# Patient Record
Sex: MALE | Race: WHITE | URBAN - METROPOLITAN AREA
[De-identification: names, ages, dates, MRNs, and addresses within clinical notes are randomized per-mention and may not be internally consistent; named-entity substitution may affect disease eponyms.]

---

## 2017-06-22 ENCOUNTER — OFFICE VISIT (OUTPATIENT)
Dept: OPHTHALMOLOGY | Facility: CLINIC | Age: 67
End: 2017-06-22

## 2017-06-22 DIAGNOSIS — Z83.518 FAMILY HISTORY OF MACULAR DEGENERATION: ICD-10-CM

## 2017-06-22 DIAGNOSIS — H52.13 MYOPIA, BILATERAL: Primary | ICD-10-CM

## 2017-06-22 DIAGNOSIS — H25.13 SENILE NUCLEAR SCLEROSIS, BILATERAL: ICD-10-CM

## 2017-06-22 ASSESSMENT — REFRACTION_CURRENTRX
OS_SPHERE: -3.75
OS_DIAMETER: 14.0
OD_SPHERE: -2.00
OS_BASECURVE: 8.5
OD_BASECURVE: 8.5
OD_DIAMETER: 14.0
OD_DIAMETER: 14.2
OD_BRAND: 1-DAY ACUVUE MOIST
OS_BASECURVE: 8.8
OS_BRAND: 1-DAY ACUVUE MOIST
OD_SPHERE: -1.75
OS_BRAND: ACUVUE OASYS
OS_SPHERE: -3.50
OD_BRAND: ACUVUE OASYS
OS_DIAMETER: 14.2
OD_BASECURVE: 8.8

## 2017-06-22 ASSESSMENT — REFRACTION_MANIFEST
OS_CYLINDER: +0.25
OS_ADD: +2.50
OD_SPHERE: -2.00
OD_CYLINDER: +0.75
OS_SPHERE: -3.75
OS_AXIS: 085
OD_ADD: +2.50
OD_AXIS: 070

## 2017-06-22 ASSESSMENT — SLIT LAMP EXAM - LIDS
COMMENTS: NORMAL
COMMENTS: NORMAL

## 2017-06-22 ASSESSMENT — EXTERNAL EXAM - RIGHT EYE: OD_EXAM: NORMAL

## 2017-06-22 ASSESSMENT — CONF VISUAL FIELD
OS_NORMAL: 1
OD_NORMAL: 1

## 2017-06-22 ASSESSMENT — TONOMETRY
OD_IOP_MMHG: 21
OS_IOP_MMHG: 21
IOP_METHOD: ICARE

## 2017-06-22 ASSESSMENT — CUP TO DISC RATIO
OS_RATIO: 0.4
OD_RATIO: 0.5

## 2017-06-22 ASSESSMENT — VISUAL ACUITY
OS_CC: 20/20
CORRECTION_TYPE: CONTACTS
OD_CC: 20/20
METHOD: SNELLEN - LINEAR

## 2017-06-22 ASSESSMENT — EXTERNAL EXAM - LEFT EYE: OS_EXAM: NORMAL

## 2017-06-22 NOTE — PROGRESS NOTES
Assessment/Plan  (H52.13) Myopia, bilateral  (primary encounter diagnosis)  Comment: Compound myopic astigmatism with presbyopia OU  Plan: REFRACTION [54100], HC CONTACT LENS FITTING COSMETIC LVL 1 (07638.011)   Educated patient on condition and clinical findings. Dispensed spectacle prescription for full time wear.   Dispensed 1-Day Acuvue Moist trial lenses, given excellent fit and vision, if patient is please with comfort, okay to finalize prescription in 1 week (patient to contact clinic).   Acuvue Phyllis trial lenses ordered in the event that the daily lens is not comfortable, or if the patient has too much difficulty with opening a new package each day.    Addendum: The patient called stating that vsion and comfort were excellent, would like prescription for 1-Day lens. Finalized prescription dispensed.    (H25.13) Senile nuclear sclerosis, bilateral  Comment: Not visually significant OU  Plan:  No treatment indicated at this time.    (Z83.518) Family history of macular degeneration  Comment: No clinical findings.  Plan:  Monitor annually. Discussed a diet rich in leafy green vegetables.    Contact Lens Billing  V-Code:  - Soft toric  Final Contact Lens Rx      Brand Base Curve Diameter Sphere   Right 1-Day Acuvue Moist 8.5 14.2 -1.75   Left 1-Day Acuvue Moist 8.5 14.2 -3.75       Expiration Date:  6/23/2019    Replacement:  Daily    Wearing Schedule:  Daily wear         CL Fitting Fee: $75    These are for cosmetic contact lenses.    Encounter Diagnoses   Name Primary?     Myopia, bilateral Yes     Senile nuclear sclerosis, bilateral      Family history of macular degeneration         Date of last eye exam: 6/22/17        Complete documentation of historical and exam elements from today's encounter can  be found in the full encounter summary report (not reduplicated in this progress  note). I personally obtained the chief complaint(s) and history of present illness. I  confirmed and edited as necessary the  review of systems, past medical/surgical  history, family history, social history, and examination findings as documented by  others; and I examined the patient myself. I personally reviewed the relevant tests,  images, and reports as documented above. I formulated and edited as necessary the  assessment and plan and discussed the findings and management plan with the  patient and family.    Mahendra Henry OD, FAAO

## 2017-06-22 NOTE — NURSING NOTE
Chief Complaints and History of Present Illnesses   Patient presents with     COMPREHENSIVE EYE EXAM     RE VA is better than LE     Contact Lens Evaluation     HPI    Affected eye(s):  Both   Symptoms:        Duration:  1 year      Do you have eye pain now?:  No      Comments:  Pt states RE VA is better than LE  Using Reading glasses over contact  Lens, No other concerns  Opal CORDERO 9:11 AM June 22, 2017

## 2017-06-22 NOTE — MR AVS SNAPSHOT
After Visit Summary   6/22/2017    Roe Jimenez    MRN: [id number]           Patient Information     Date Of Birth          1950        Visit Information        Provider Department      6/22/2017 9:00 AM Mahendra Henry OD [facility]        Today's Diagnoses     Myopia, bilateral    -  1    Senile nuclear sclerosis, bilateral        Family history of macular degeneration           Follow-ups after your visit        Follow-up notes from your care team     Return if symptoms worsen or fail to improve, for Contact Lens Follow-up.      Who to contact     Please call your clinic at [phone] to:    Ask questions about your health    Make or cancel appointments    Discuss your medicines    Learn about your test results    Speak to your doctor   If you have compliments or concerns about an experience at your clinic, or if you wish to file a complaint, please contact [facility] Patient Relations at [phone] or email us at [email or web address]         Additional Information About Your Visit        Care EveryWhere ID     This is your Care EveryWhere ID. This could be used by other organizations to access your [place] medical records  [id number]         Blood Pressure from Last 3 Encounters:   No data found for BP    Weight from Last 3 Encounters:   No data found for Wt              We Performed the Following     HC CONTACT LENS FITTING COSMETIC LVL 1 (53431.011)     REFRACTION [17884]        Primary Care Provider Office Phone # Fax #    Daryl Young [phone]       [facility] [address] [address]        Equal Access to Services     DECLAN ROLAND : Hadii aad caridad welsho Soyannick, waaxda luqadaha, qaybta kaalmada nikoleyanan, waxhebert liseth castillo. So [facility] [phone].    ATENCIÓN: Si habla español, tiene a mendiola disposición servicios gratuitos de asistencia  lingüística. Alyson al [phone].    We comply with applicable federal civil rights laws and [place] laws. We do not discriminate on the basis of race, color, national origin, age, disability sex, sexual orientation or gender identity.            Thank you!     Thank you for choosing [facility]  for your care. Our goal is always to provide you with excellent care. Hearing back from our patients is one way we can continue to improve our services. Please take a few minutes to complete the written survey that you may receive in the mail after your visit with us. Thank you!             Your Updated Medication List - Protect others around you: Learn how to safely use, store and throw away your medicines at www.disposemymeds.org.          This list is accurate as of: 6/22/17 11:26 AM.  Always use your most recent med list.                   Brand Name Dispense Instructions for use Diagnosis    aspirin 81 MG tablet      Take by mouth daily        hydrochlorothiazide 12.5 MG capsule    MICROZIDE     Take 12.5 mg by mouth daily        LISINOPRIL PO           PANTOPRAZOLE SODIUM PO           SIMVASTATIN PO           TERAZOSIN HCL PO

## 2017-08-14 ENCOUNTER — OFFICE VISIT (OUTPATIENT)
Dept: OPHTHALMOLOGY | Facility: CLINIC | Age: 67
End: 2017-08-14

## 2017-08-14 DIAGNOSIS — H10.13 ALLERGIC CONJUNCTIVITIS, BILATERAL: ICD-10-CM

## 2017-08-14 DIAGNOSIS — H02.889 MEIBOMIAN GLAND DYSFUNCTION: Primary | ICD-10-CM

## 2017-08-14 RX ORDER — OLOPATADINE HYDROCHLORIDE 1 MG/ML
1 SOLUTION/ DROPS OPHTHALMIC 2 TIMES DAILY
Qty: 1 BOTTLE | Refills: 3 | Status: SHIPPED | OUTPATIENT
Start: 2017-08-14 | End: 2018-06-04

## 2017-08-14 ASSESSMENT — SLIT LAMP EXAM - LIDS: COMMENTS: 1+ MGD

## 2017-08-14 ASSESSMENT — EXTERNAL EXAM - LEFT EYE: OS_EXAM: NORMAL

## 2017-08-14 ASSESSMENT — TONOMETRY
IOP_METHOD: ICARE
OS_IOP_MMHG: 17
OD_IOP_MMHG: 16

## 2017-08-14 ASSESSMENT — VISUAL ACUITY
OS_CC+: -1
OD_CC+: -2+2
OS_CC: 20/20
METHOD: SNELLEN - LINEAR
CORRECTION_TYPE: GLASSES
OD_CC: 20/25

## 2017-08-14 ASSESSMENT — EXTERNAL EXAM - RIGHT EYE: OD_EXAM: NORMAL

## 2017-08-14 ASSESSMENT — CONF VISUAL FIELD
OD_NORMAL: 1
OS_NORMAL: 1

## 2017-08-14 NOTE — MR AVS SNAPSHOT
After Visit Summary   8/14/2017    Roe Jimenez    MRN: [id number]           Patient Information     Date Of Birth          1950        Visit Information        Provider Department      8/14/2017 9:40 AM Mahendra Henry, [facility]        Today's Diagnoses     Meibomian gland dysfunction    -  1    Allergic conjunctivitis, bilateral           Follow-ups after your visit        Follow-up notes from your care team     Return in about 1 month (around 9/14/2017).      Who to contact     Please call your clinic at [phone] to:    Ask questions about your health    Make or cancel appointments    Discuss your medicines    Learn about your test results    Speak to your doctor   If you have compliments or concerns about an experience at your clinic, or if you wish to file a complaint, please contact [facility] Patient Relations at [phone] or email us at [email or web address]         Additional Information About Your Visit        Care EveryWhere ID     This is your Care EveryWhere ID. This could be used by other organizations to access your [place] medical records  [id number]         Blood Pressure from Last 3 Encounters:   No data found for BP    Weight from Last 3 Encounters:   No data found for Wt              Today, you had the following     No orders found for display         Today's Medication Changes          These changes are accurate as of: 8/14/17 10:25 AM.  If you have any questions, ask your nurse or doctor.               Start taking these medicines.        Dose/Directions    olopatadine 0.1 % ophthalmic solution   Commonly known as:  PATANOL   Used for:  Allergic conjunctivitis, bilateral   Started by:  Mahendra Henry, OD        Dose:  1 drop   Place 1 drop into both eyes 2 times daily   Quantity:  1 Bottle   Refills:  3            Where to get your medicines      These medications were sent to [facility]  [facility] [address] [facility] [place] [address] AT [facility] [address], [facility] [address]     Phone:  [phone]     olopatadine 0.1 % ophthalmic solution                Primary Care Provider Office Phone # Fax #    Daryl Young [phone]       [facility] [address] [facility] [address]        Equal Access to Services     DECLAN ROLAND : Hadii aad ku hadasho Soomaali, waaxda luqadaha, qaybta kaalmada adeegyada, waxay idiin hayaan adeeg kharash la'yadiel castillo. So [facility] [phone].    ATENCIÓN: Si habla español, tiene a mendiola disposición servicios gratuitos de asistencia lingüística. [facility] [phone].    We comply with applicable federal civil rights laws and [place] laws. We do not discriminate on the basis of race, color, national origin, age, disability sex, sexual orientation or gender identity.            Thank you!     Thank you for choosing [facility]  for your care. Our goal is always to provide you with excellent care. Hearing back from our patients is one way we can continue to improve our services. Please take a few minutes to complete the written survey that you may receive in the mail after your visit with us. Thank you!             Your Updated Medication List - Protect others around you: Learn how to safely use, store and throw away your medicines at www.disposemymeds.org.          This list is accurate as of: 8/14/17 10:25 AM.  Always use your most recent med list.                   Brand Name Dispense Instructions for use Diagnosis    aspirin 81 MG tablet      Take by mouth daily        hydrochlorothiazide 12.5 MG capsule    MICROZIDE     Take 12.5 mg by mouth daily        LISINOPRIL PO           olopatadine 0.1 % ophthalmic solution    PATANOL    1 Bottle    Place 1 drop into both eyes 2 times daily    Allergic conjunctivitis, bilateral       PANTOPRAZOLE SODIUM PO           SIMVASTATIN PO            TERAZOSIN HCL PO

## 2017-08-14 NOTE — NURSING NOTE
Chief Complaints and History of Present Illnesses   Patient presents with     Eye Problem Both Eyes     Irritation wearing contact lens'     HPI    Symptoms:        Duration:  2 days   Frequency:  Constant       Do you have eye pain now?:  Yes   Location:  OD   Pain Level:  Mild Pain (2)      Comments:  Pt stopped wearing contacts yesterday  Evening and eyes seem better without  Contacts in Pt upset he has purchased  A years supply already, he hopes its  An eye infection but seems like the  Contact lens, pain when moving eyes  And pressing on eye lids  Opal Howell COT 9:50 AM August 14, 2017

## 2017-08-14 NOTE — PROGRESS NOTES
Assessment/Plan  (H02.89) Meibomian gland dysfunction  (primary encounter diagnosis)  Comment: Capped glands OD, signs worse OD than OS  Plan:  Educated patient on condition and clinical findings. Recommended warm compresses 3-4 times each day for ten minutes and artificial tears prn. Monitor at follow-up in 1 month, or sooner if symptoms worsen.    (H10.13) Allergic conjunctivitis, bilateral  Plan: olopatadine (PATANOL) 0.1 % ophthalmic solution   Prescribed olopatadine bid OU. Monitor at follow-up.          Complete documentation of historical and exam elements from today's encounter can  be found in the full encounter summary report (not reduplicated in this progress  note). I personally obtained the chief complaint(s) and history of present illness. I  confirmed and edited as necessary the review of systems, past medical/surgical  history, family history, social history, and examination findings as documented by  others; and I examined the patient myself. I personally reviewed the relevant tests,  images, and reports as documented above. I formulated and edited as necessary the  assessment and plan and discussed the findings and management plan with the  patient and family.    Mahendra Henry, OD, FAAO

## 2017-09-28 ENCOUNTER — OFFICE VISIT (OUTPATIENT)
Dept: OPHTHALMOLOGY | Facility: CLINIC | Age: 67
End: 2017-09-28

## 2017-09-28 DIAGNOSIS — H10.13 ALLERGIC CONJUNCTIVITIS, BILATERAL: ICD-10-CM

## 2017-09-28 DIAGNOSIS — H02.889 MEIBOMIAN GLAND DYSFUNCTION: Primary | ICD-10-CM

## 2017-09-28 ASSESSMENT — VISUAL ACUITY
OD_SC: 20/20
OS_SC+: -1
METHOD: SNELLEN - LINEAR
OS_SC: 20/20

## 2017-09-28 ASSESSMENT — CONF VISUAL FIELD
OS_NORMAL: 1
OD_NORMAL: 1

## 2017-09-28 ASSESSMENT — TONOMETRY
IOP_METHOD: ICARE
OD_IOP_MMHG: 18
OS_IOP_MMHG: 17

## 2017-09-28 ASSESSMENT — EXTERNAL EXAM - LEFT EYE: OS_EXAM: NORMAL

## 2017-09-28 ASSESSMENT — EXTERNAL EXAM - RIGHT EYE: OD_EXAM: NORMAL

## 2017-09-28 NOTE — PROGRESS NOTES
Assessment/Plan  (H02.89) Meibomian gland dysfunction  (primary encounter diagnosis)  Comment: Symptoms and signs significantly improved with therapy  Plan:  Educated patient on condition and clinical findings. Continue use of warm compresses once each day for ten minutes. Recommended Blink Contacts while lenses are worn, and Systane Balance at night. Monitor annually, or sooner if symptoms worsen.    (H10.13) Allergic conjunctivitis, bilateral  Comment: Patient discontinued drops  Plan:  Recommended restarting use of Patanol bid OU throughout allergy season.      Complete documentation of historical and exam elements from today's encounter can  be found in the full encounter summary report (not reduplicated in this progress  note). I personally obtained the chief complaint(s) and history of present illness. I  confirmed and edited as necessary the review of systems, past medical/surgical  history, family history, social history, and examination findings as documented by  others; and I examined the patient myself. I personally reviewed the relevant tests,  images, and reports as documented above. I formulated and edited as necessary the  assessment and plan and discussed the findings and management plan with the  patient and family.    Mahendra Henry OD, FAAO

## 2017-09-28 NOTE — MR AVS SNAPSHOT
After Visit Summary   2017    Roe Jimenez    MRN: [id number]           Patient Information     Date Of Birth          1950        Visit Information        Provider Department      2017 10:20 AM Mahendra Henry OD [facility]        Today's Diagnoses     Meibomian gland dysfunction    -  1    Allergic conjunctivitis, bilateral           Follow-ups after your visit        Follow-up notes from your care team     Return if symptoms worsen or fail to improve, for Follow Up.      Who to contact     Please call your clinic at [phone] to:    Ask questions about your health    Make or cancel appointments    Discuss your medicines    Learn about your test results    Speak to your doctor   If you have compliments or concerns about an experience at your clinic, or if you wish to file a complaint, please contact [facility] Patient Relations at [phone] or email us at [email or web address]         Additional Information About Your Visit        MyChart Information     [email or web address] is an electronic gateway that provides easy, online access to your medical records. With [email or web address], you can request a clinic appointment, read your test results, renew a prescription or communicate with your care team.     To sign up for MobileProt visit the website at [email or web address]   You will be asked to enter the access code listed below, as well as some personal information. Please follow the directions to create your username and password.     Your access code is: [email or web address]  Expires: 2017 11:01 AM     Your access code will  in 90 days. If you need help or a new code, please contact your [facility] Clinic or call [phone] for assistance.        Care EveryWhere ID     This is your Care EveryWhere ID. This could be used by other organizations to access your [place] medical records  [id number]          Blood Pressure from Last 3 Encounters:   No data found for BP    Weight from Last 3 Encounters:   No data found for Wt              Today, you had the following     No orders found for display       Primary Care Provider Office Phone # Fax #    Daryl Young [phone]       [facility] [address] [facility] [address]        Equal Access to Services     [facility]: Hadii aad ku hadasho Soomaali, waaxda luqadaha, qaybta kaalmada adeegyada, waxay idiin hayaan adeeg kharash la'yadiel . So [facility] [phone].    ATENCIÓN: Si habla español, tiene a mendiola disposición servicios gratuitos de asistencia lingüística. Llame al [phone].    We comply with applicable federal civil rights laws and [place] laws. We do not discriminate on the basis of race, color, national origin, age, disability sex, sexual orientation or gender identity.            Thank you!     Thank you for choosing [facility]  for your care. Our goal is always to provide you with excellent care. Hearing back from our patients is one way we can continue to improve our services. Please take a few minutes to complete the written survey that you may receive in the mail after your visit with us. Thank you!             Your Updated Medication List - Protect others around you: Learn how to safely use, store and throw away your medicines at www.disposemymeds.org.          This list is accurate as of: 9/28/17 11:01 AM.  Always use your most recent med list.                   Brand Name Dispense Instructions for use Diagnosis    aspirin 81 MG tablet      Take by mouth daily        hydrochlorothiazide 12.5 MG capsule    MICROZIDE     Take 12.5 mg by mouth daily        LISINOPRIL PO           olopatadine 0.1 % ophthalmic solution    PATANOL    1 Bottle    Place 1 drop into both eyes 2 times daily    Allergic conjunctivitis, bilateral       PANTOPRAZOLE SODIUM PO           SIMVASTATIN PO            TERAZOSIN HCL PO

## 2018-06-04 ENCOUNTER — OFFICE VISIT (OUTPATIENT)
Dept: OPHTHALMOLOGY | Facility: CLINIC | Age: 68
End: 2018-06-04
Payer: COMMERCIAL

## 2018-06-04 DIAGNOSIS — H02.889 MEIBOMIAN GLAND DYSFUNCTION: ICD-10-CM

## 2018-06-04 DIAGNOSIS — H52.13 MYOPIA OF BOTH EYES: ICD-10-CM

## 2018-06-04 DIAGNOSIS — H10.13 ALLERGIC CONJUNCTIVITIS OF BOTH EYES: ICD-10-CM

## 2018-06-04 DIAGNOSIS — H25.13 NUCLEAR SCLEROSIS OF BOTH EYES: Primary | ICD-10-CM

## 2018-06-04 DIAGNOSIS — Z83.518 FAMILY HISTORY OF MACULAR DEGENERATION: ICD-10-CM

## 2018-06-04 DIAGNOSIS — H53.10 SUBJECTIVE VISUAL DISTURBANCE OF BOTH EYES: ICD-10-CM

## 2018-06-04 ASSESSMENT — TONOMETRY
OD_IOP_MMHG: 21
OS_IOP_MMHG: 21
IOP_METHOD: ICARE

## 2018-06-04 ASSESSMENT — VISUAL ACUITY
CORRECTION_TYPE: CONTACTS
METHOD: SNELLEN - LINEAR
OS_CC: 20/20
OD_CC: 20/20
CORRECTION_TYPE: GLASSES

## 2018-06-04 ASSESSMENT — CUP TO DISC RATIO
OD_RATIO: 0.6/0.5
OS_RATIO: 0.5

## 2018-06-04 ASSESSMENT — REFRACTION_MANIFEST
OD_SPHERE: PLANO
OS_SPHERE: PLANO

## 2018-06-04 ASSESSMENT — EXTERNAL EXAM - RIGHT EYE: OD_EXAM: NORMAL

## 2018-06-04 ASSESSMENT — EXTERNAL EXAM - LEFT EYE: OS_EXAM: NORMAL

## 2018-06-04 ASSESSMENT — CONF VISUAL FIELD
OS_NORMAL: 1
OD_NORMAL: 1

## 2018-06-04 NOTE — NURSING NOTE
Chief Complaints and History of Present Illnesses   Patient presents with     Annual Eye Exam     Visual Field Disturbance     HPI    Symptoms:        Duration:  6 months   Frequency:  Intermittent, Weekly       Do you have eye pain now?:  No      Comments:  Noticed when driving  It is a light in Medial portion  Of RE, Denies flashes and  Floaters, using Blink for Contacts  Opal CORDERO 10:06 AM June 4, 2018

## 2018-06-04 NOTE — MR AVS SNAPSHOT
After Visit Summary   2018    Roe Jimenez    MRN: [id number]           Patient Information     Date Of Birth          1950        Visit Information        Provider Department      2018 10:00 AM Mahendra Henry OD [facility]        Today's Diagnoses     Nuclear sclerosis of both eyes    -  1    Subjective visual disturbance of both eyes        Meibomian gland dysfunction        Allergic conjunctivitis of both eyes        Family history of macular degeneration        Myopia of both eyes           Follow-ups after your visit        Who to contact     Please call your clinic at [phone] to:    Ask questions about your health    Make or cancel appointments    Discuss your medicines    Learn about your test results    Speak to your doctor            Additional Information About Your Visit        MyChart Information     Reclutec is an electronic gateway that provides easy, online access to your medical records. With Reclutec, you can request a clinic appointment, read your test results, renew a prescription or communicate with your care team.     To sign up for Tytanium Ideast visit the website at [email or web address]   You will be asked to enter the access code listed below, as well as some personal information. Please follow the directions to create your username and password.     Your access code is: [email or web address]  Expires: 2018  6:31 AM     Your access code will  in 90 days. If you need help or a new code, please contact your [facility] or call [phone] for assistance.        Care EveryWhere ID     This is your Care EveryWhere ID. This could be used by other organizations to access your [place] medical records  [id number]         Blood Pressure from Last 3 Encounters:   No data found for BP    Weight from Last 3 Encounters:   No data found for Wt              Today, you had the following     No orders found  for display       Primary Care Provider Office Phone # Fax #    Daryl Young [phone]       [facility] [address] [facility] [address]        Equal Access to Services     DECLAN ROLAND : Hadii aad ku hadadryo Soomaali, waaxda luqadaha, qaybta kaalmada adeegyada, waxhebert doddn nenavalerie rodriguez wagner castillo. So [facility] [phone].    ATENCIÓN: Si habla español, tiene a mendiola disposición servicios gratuitos de asistencia lingüística. Llame al [phone].    We comply with applicable federal civil rights laws and [place] laws. We do not discriminate on the basis of race, color, national origin, age, disability, sex, sexual orientation, or gender identity.            Thank you!     Thank you for choosing [facility]  for your care. Our goal is always to provide you with excellent care. Hearing back from our patients is one way we can continue to improve our services. Please take a few minutes to complete the written survey that you may receive in the mail after your visit with us. Thank you!             Your Updated Medication List - Protect others around you: Learn how to safely use, store and throw away your medicines at www.disposemymeds.org.          This list is accurate as of 6/4/18 12:42 PM.  Always use your most recent med list.                   Brand Name Dispense Instructions for use Diagnosis    aspirin 81 MG tablet      Take by mouth daily        hydrochlorothiazide 12.5 MG capsule    MICROZIDE     Take 12.5 mg by mouth daily        LISINOPRIL PO           PANTOPRAZOLE SODIUM PO           SIMVASTATIN PO           TERAZOSIN HCL PO

## 2018-06-04 NOTE — PROGRESS NOTES
Assessment/Plan  (H25.13) Nuclear sclerosis of both eyes  (primary encounter diagnosis)  Comment: Not visually significant OU  Plan:  Educated patient on clinical findings. No treatment indicated at this time. Monitor annually.    (H53.10) Subjective visual disturbance of both eyes  Comment: Clears with blinking, infrequent  Plan:  Explained to patient that symptoms sound consistent with ocular surface disease, such as dry eye and/or allergies.    (H02.89) Meibomian gland dysfunction  Comment: Improved symptoms and signs with warm compresses  Plan:  Continue use of warm compresses as needed. Monitor annually, or sooner if symptoms worsen.    (H10.13) Allergic conjunctivitis of both eyes  Comment: Minimally symptomatic  Plan:  Recommended Zaditor during allergy seasons as needed.    (Z83.518) Family history of macular degeneration  Comment: No findings consistent with macular degeneration  Plan:  Monitor annually. Recommended diet rich with leafy greens.     (H52.13) Myopia of both eyes  Comment: Not assessed at this visit.  Plan:  Refraction and contact lens fitting to be rechecked next year.    Return to clinic in 1 year for comprehensive eye exam and contact lens fitting.    Complete documentation of historical and exam elements from today's encounter can  be found in the full encounter summary report (not reduplicated in this progress  note). I personally obtained the chief complaint(s) and history of present illness. I  confirmed and edited as necessary the review of systems, past medical/surgical  history, family history, social history, and examination findings as documented by  others; and I examined the patient myself. I personally reviewed the relevant tests,  images, and reports as documented above. I formulated and edited as necessary the  assessment and plan and discussed the findings and management plan with the  patient and family.    Mahendra Henry, OD, FAAO

## 2019-01-29 ENCOUNTER — OFFICE VISIT (OUTPATIENT)
Dept: OPHTHALMOLOGY | Facility: CLINIC | Age: 69
End: 2019-01-29
Payer: COMMERCIAL

## 2019-01-29 DIAGNOSIS — H25.13 NUCLEAR SCLEROTIC CATARACT OF BOTH EYES: ICD-10-CM

## 2019-01-29 DIAGNOSIS — H52.13 MYOPIA, BILATERAL: ICD-10-CM

## 2019-01-29 DIAGNOSIS — H43.813 PVD (POSTERIOR VITREOUS DETACHMENT), BILATERAL: Primary | ICD-10-CM

## 2019-01-29 ASSESSMENT — TONOMETRY
OD_IOP_MMHG: 15
IOP_METHOD: TONOPEN
OS_IOP_MMHG: 16

## 2019-01-29 ASSESSMENT — VISUAL ACUITY
METHOD: SNELLEN - LINEAR
OD_CC: 20/20-2
OS_CC: 20/20+3
CORRECTION_TYPE: CONTACTS

## 2019-01-29 ASSESSMENT — CONF VISUAL FIELD
OS_NORMAL: 1
METHOD: COUNTING FINGERS
OD_NORMAL: 1

## 2019-01-29 ASSESSMENT — CUP TO DISC RATIO
OD_RATIO: 0.55/0.5
OS_RATIO: 0.4

## 2019-01-29 ASSESSMENT — EXTERNAL EXAM - LEFT EYE: OS_EXAM: NORMAL

## 2019-01-29 ASSESSMENT — EXTERNAL EXAM - RIGHT EYE: OD_EXAM: NORMAL

## 2019-01-29 NOTE — PROGRESS NOTES
HPI  Roe Jimenez is a 68 year old male here for new floaters right eye in temporal periphery that occasionally come in to mid range vision, began Saturday night.  No flashing lights.  Floaters are not black, and they are not round.  Described as a crescent shape and they are more noticeable in brightly lit rooms. No eye trauma.    PMH:  Hypertension, hyperlipidemia, terazosin dilates well  POH:  Glasses for myopia, soft contact lens wear, no surgery, no trauma  Oc Meds:  none  FH:  Denies any glaucoma, +age related macular degeneration, no other known eye diseases       Assessment & Plan      (H43.813) PVD (posterior vitreous detachment), bilateral - Right Eye  (primary encounter diagnosis)  Comment:  No Daniel's sign, retinal tears, or detachment seen on exam today.  Plan:  Natural history of posterior vitreous detachment as well as retinal tear/detachment symptoms discussed with patient.  Told to call for prompt dilated exam if these symptoms occur.  Discussed looking up the eye clinics with patient and seeking care if any changes on upcoming trip to [facility].  Patient will follow-up here upon his return for another dilated fundus exam.    (H52.13) Myopia, bilateral - Both Eyes  Comment: good visual acuity with contact lenses  Plan: continue same    (H25.13) Nuclear sclerotic cataract of both eyes - Both Eyes  Comment: mild not visually significant   Plan: follow     Mild C/d asymmetry- good intraocular pressure follow for clinical changes  -----------------------------------------------------------------------------------    Patient disposition:   Return in about 1 month (around 2/28/2019) for Follow-up PVD, dilated OD. Call for sooner appointment as needed.    Complete documentation of historical and exam elements from today's encounter can be found in the full encounter summary report (not reduplicated in this progress note). I personally obtained the chief complaint(s) and history of present illness.   I have confirmed and edited as necessary the CC, HPI, PMH/PSH, social history, FMH, ROS, and exam/neuro findings as obtained by the technician or others. I have examined this patient myself and I personally viewed the image(s) and studies listed above and the documentation reflects my findings and interpretation.  I formulated and edited as necessary the assessment and plan and discussed the findings and management plan with the patient and family.     Merly Mckeon MD

## 2019-03-05 ENCOUNTER — OFFICE VISIT (OUTPATIENT)
Dept: OPHTHALMOLOGY | Facility: CLINIC | Age: 69
End: 2019-03-05
Payer: COMMERCIAL

## 2019-03-05 DIAGNOSIS — H43.813 PVD (POSTERIOR VITREOUS DETACHMENT), BILATERAL: ICD-10-CM

## 2019-03-05 DIAGNOSIS — H25.13 NUCLEAR SCLEROTIC CATARACT OF BOTH EYES: Primary | ICD-10-CM

## 2019-03-05 ASSESSMENT — VISUAL ACUITY
CORRECTION_TYPE: CONTACTS
METHOD: SNELLEN - LINEAR
OD_CC+: -2
OD_CC: 20/20
OS_CC: 20/20

## 2019-03-05 ASSESSMENT — CUP TO DISC RATIO
OS_RATIO: 0.4
OD_RATIO: 0.55/0.5

## 2019-03-05 ASSESSMENT — EXTERNAL EXAM - RIGHT EYE: OD_EXAM: NORMAL

## 2019-03-05 ASSESSMENT — REFRACTION_WEARINGRX
OS_CYLINDER: +0.25
OD_CYLINDER: SPHERE
OS_SPHERE: -4.25
SPECS_TYPE: PAL
OD_ADD: +2.50
OD_SPHERE: -2.00
OS_AXIS: 060
OS_ADD: +2.50

## 2019-03-05 ASSESSMENT — CONF VISUAL FIELD
OD_NORMAL: 1
OS_NORMAL: 1

## 2019-03-05 ASSESSMENT — EXTERNAL EXAM - LEFT EYE: OS_EXAM: NORMAL

## 2019-03-05 ASSESSMENT — TONOMETRY
OD_IOP_MMHG: 16
IOP_METHOD: TONOPEN

## 2019-03-05 NOTE — PROGRESS NOTES
HPI  Roe Jimenez is a 68 year old male here for follow-up floaters right eye in temporal periphery that occasionally come in to mid range vision.   No change from last visit, sees floaters less than before.      PMH:  Hypertension, hyperlipidemia, terazosin dilates well  POH:  Glasses for myopia, soft contact lens wear, no surgery, no trauma  Oc Meds:  none  FH:  Denies any glaucoma, +age related macular degeneration, no other known eye diseases       Assessment & Plan      (H43.383) PVD (posterior vitreous detachment), bilateral - Right Eye  (primary encounter diagnosis)  Comment:  No Daniel's sign, retinal tears, or detachment seen on exam today.  Plan:  Natural history of posterior vitreous detachment as well as retinal tear/detachment symptoms discussed with patient.  Told to call for prompt dilated exam if these symptoms occur.  Follow-up for comprehensive eye exam as scheduled, sooner if any changes.    -----------------------------------------------------------------------------------    Patient disposition:   Return in about 6 months (around 9/5/2019) for Comprehensive Exam. Call for sooner appointment as needed.    Complete documentation of historical and exam elements from today's encounter can be found in the full encounter summary report (not reduplicated in this progress note). I personally obtained the chief complaint(s) and history of present illness.  I have confirmed and edited as necessary the CC, HPI, PMH/PSH, social history, FMH, ROS, and exam/neuro findings as obtained by the technician or others. I have examined this patient myself and I personally viewed the image(s) and studies listed above and the documentation reflects my findings and interpretation.  I formulated and edited as necessary the assessment and plan and discussed the findings and management plan with the patient and family.     Merly Mckeon MD

## 2019-05-29 ENCOUNTER — OFFICE VISIT (OUTPATIENT)
Dept: OPHTHALMOLOGY | Facility: CLINIC | Age: 69
End: 2019-05-29
Payer: COMMERCIAL

## 2019-05-29 DIAGNOSIS — H52.13 MYOPIA OF BOTH EYES: Primary | ICD-10-CM

## 2019-05-29 ASSESSMENT — REFRACTION_CURRENTRX
OD_DIAMETER: 14.2
OS_BASECURVE: 8.5
OD_BRAND: 1-DAY ACUVUE MOIST
OS_DIAMETER: 14.2
OD_SPHERE: -1.75
OD_BASECURVE: 8.5
OS_DIAMETER: 14.2
OS_BRAND: 1-DAY ACUVUE MOIST
OS_SPHERE: -3.75
OD_DIAMETER: 14.2
OD_SPHERE: -1.50
OS_SPHERE: -3.50
OS_BASECURVE: 8.5
OS_BRAND: 1-DAY ACUVUE MOIST
OD_BASECURVE: 8.5
OD_BRAND: 1-DAY ACUVUE MOIST

## 2019-05-29 ASSESSMENT — CONF VISUAL FIELD
OS_NORMAL: 1
METHOD: COUNTING FINGERS
OD_NORMAL: 1

## 2019-05-29 ASSESSMENT — TONOMETRY
OS_IOP_MMHG: 16
IOP_METHOD: ICARE
OD_IOP_MMHG: 14

## 2019-05-29 ASSESSMENT — VISUAL ACUITY
METHOD: SNELLEN - LINEAR
CORRECTION_TYPE: CONTACTS
OS_CC: 20/20
OD_CC: 20/20
OS_CC+: -1

## 2019-05-29 ASSESSMENT — REFRACTION_MANIFEST
OS_CYLINDER: SPHERE
OD_AXIS: 070
OS_ADD: +2.00
OD_CYLINDER: +0.75
OS_SPHERE: -3.50
OD_SPHERE: -1.75
OD_ADD: +2.00

## 2019-05-29 ASSESSMENT — EXTERNAL EXAM - LEFT EYE: OS_EXAM: NORMAL

## 2019-05-29 ASSESSMENT — EXTERNAL EXAM - RIGHT EYE: OD_EXAM: NORMAL

## 2019-05-29 NOTE — PROGRESS NOTES
History  HPI     Contact Lens Evaluation     In both eyes.  This started 2 years ago.  Since onset it is stable.  Pain was noted as 0/10.              Comments     Patient is here for yearly check up with contact lens exam, happy with vision and are comfortable.     Rohini Calderon May 29, 2019 2:38 PM            Last edited by Mahendra Henry, OD on 5/29/2019  3:10 PM. (History)          Assessment/Plan  (H52.13) Myopia of both eyes  (primary encounter diagnosis)  Comment: Myopia with presbyopia both eyes, happy in contact lenses, wears reading glasses for near tasks  Plan: C CONTACT LENS FITTING COSMETIC LVL 1 - ADULT, REFRACTION         Educated patient on condition and clinical findings. Dispensed spectacle prescription for as-needed wear. Educated patient on possibility of adaptation period, if symptoms do not improve return to clinic for further testing.   Dispensed trial lenses and finalized contact lens prescription for 2 years.    No office visit charge assessed as patient is presenting for contact lens fit and refraction only.    Contact Lens Billing  V-Code:  - Soft spherical  Final Contact Lens Rx       Brand Base Curve Diameter Sphere    Right 1-Day Acuvue Moist 8.5 14.2 -1.50    Left 1-Day Acuvue Moist 8.5 14.2 -3.50    Expiration Date:  5/29/2021    Replacement:  Daily    Wearing Schedule:  Daily wear         CL Fitting Fee: $75    These are for cosmetic contact lenses.    Encounter Diagnosis   Name Primary?     Myopia of both eyes Yes      Complete documentation of historical and exam elements from today's encounter can  be found in the full encounter summary report (not reduplicated in this progress  note). I personally obtained the chief complaint(s) and history of present illness. I  confirmed and edited as necessary the review of systems, past medical/surgical  history, family history, social history, and examination findings as documented by  others; and I examined the patient myself. I  personally reviewed the relevant tests,  images, and reports as documented above. I formulated and edited as necessary the  assessment and plan and discussed the findings and management plan with the  patient and family.    Mahendra Henry OD, FAAO

## 2019-05-29 NOTE — NURSING NOTE
Chief Complaints and History of Present Illnesses   Patient presents with     COMPREHENSIVE EYE EXAM     Chief Complaint(s) and History of Present Illness(es)     COMPREHENSIVE EYE EXAM     Laterality: both eyes    Associated symptoms: dryness (last year, started using Blink for CTL qd, instills before puttling CTL in eyes.).  Negative for tearing, eye pain, flashes and floaters              Comments     Patient is here for yearly check up with contact lens exam, happy with vision and are comfortable.     Rohini Calderon May 29, 2019 2:38 PM

## 2024-07-27 ENCOUNTER — APPOINTMENT (OUTPATIENT)
Dept: CT IMAGING | Age: 74
End: 2024-07-27
Attending: EMERGENCY MEDICINE

## 2024-07-27 ENCOUNTER — HOSPITAL ENCOUNTER (EMERGENCY)
Age: 74
Discharge: HOME | End: 2024-07-27
Attending: EMERGENCY MEDICINE

## 2024-07-27 VITALS
BODY MASS INDEX: 25.9 KG/M2 | HEIGHT: 67 IN | WEIGHT: 165 LBS | TEMPERATURE: 97.5 F | OXYGEN SATURATION: 97 % | SYSTOLIC BLOOD PRESSURE: 144 MMHG | HEART RATE: 75 BPM | RESPIRATION RATE: 18 BRPM | DIASTOLIC BLOOD PRESSURE: 67 MMHG

## 2024-07-27 DIAGNOSIS — I95.9 HYPOTENSION, UNSPECIFIED HYPOTENSION TYPE: ICD-10-CM

## 2024-07-27 DIAGNOSIS — S09.90XA CLOSED HEAD INJURY, INITIAL ENCOUNTER: ICD-10-CM
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
## 2024-07-27 LAB
ALBUMIN SERPL-MCNC: 3.9 G/DL (ref 3.6–5.1)
ALBUMIN/GLOB SERPL: 1.5 {RATIO} (ref 1–2.4)
ALP SERPL-CCNC: 71 UNITS/L (ref 45–117)
ALT SERPL-CCNC: 31 UNITS/L
ANION GAP BLD CALC-SCNC: 14 MMOL/L (ref 7–19)
ANION GAP SERPL CALC-SCNC: 9 MMOL/L (ref 7–19)
AST SERPL-CCNC: 22 UNITS/L
ATRIAL RATE (BPM): 56
BASOPHILS # BLD: 0.1 K/MCL (ref 0–0.3)
BASOPHILS NFR BLD: 1 %
BILIRUB SERPL-MCNC: 0.6 MG/DL (ref 0.2–1)
BUN BLD-MCNC: 22 MG/DL (ref 6–20)
BUN SERPL-MCNC: 22 MG/DL (ref 6–20)
BUN/CREAT SERPL: 28 (ref 7–25)
CA-I BLD-SCNC: 1.21 MMOL/L (ref 1.15–1.29)
CALCIUM SERPL-MCNC: 9 MG/DL (ref 8.4–10.2)
CHLORIDE BLD-SCNC: 102 MMOL/L (ref 97–110)
CHLORIDE SERPL-SCNC: 105 MMOL/L (ref 97–110)
CO2 BLD-SCNC: 26 MMOL/L (ref 19–24)
CO2 SERPL-SCNC: 27 MMOL/L (ref 21–32)
CREAT SERPL-MCNC: 0.78 MG/DL (ref 0.67–1.17)
CREAT SERPL-MCNC: 0.9 MG/DL (ref 0.67–1.17)
DEPRECATED RDW RBC: 47.8 FL (ref 39–50)
DIASTOLIC BLOOD PRESSURE: 58
EGFRCR SERPLBLD CKD-EPI 2021: 89 ML/MIN/{1.73_M2}
EGFRCR SERPLBLD CKD-EPI 2021: >90 ML/MIN/{1.73_M2}
EOSINOPHIL # BLD: 0.6 K/MCL (ref 0–0.5)
EOSINOPHIL NFR BLD: 8 %
ERYTHROCYTE [DISTWIDTH] IN BLOOD: 13.5 % (ref 11–15)
ETHANOL SERPL-MCNC: 17 MG/DL
FASTING DURATION TIME PATIENT: ABNORMAL H
GLOBULIN SER-MCNC: 2.6 G/DL (ref 2–4)
GLUCOSE BLD-MCNC: 106 MG/DL (ref 70–99)
GLUCOSE SERPL-MCNC: 111 MG/DL (ref 70–99)
HCT VFR BLD CALC: 41 % (ref 39–51)
HCT VFR BLD CALC: 41.3 % (ref 39–51)
HGB BLD CALC-MCNC: 13.9 G/DL (ref 13–17)
HGB BLD-MCNC: 13.3 G/DL (ref 13–17)
IMM GRANULOCYTES # BLD AUTO: 0 K/MCL (ref 0–0.2)
IMM GRANULOCYTES # BLD: 0 %
LYMPHOCYTES # BLD: 1.7 K/MCL (ref 1–4)
LYMPHOCYTES NFR BLD: 23 %
MAGNESIUM SERPL-MCNC: 2.5 MG/DL (ref 1.7–2.4)
MCH RBC QN AUTO: 30.9 PG (ref 26–34)
MCHC RBC AUTO-ENTMCNC: 32.2 G/DL (ref 32–36.5)
MCV RBC AUTO: 95.8 FL (ref 78–100)
MONOCYTES # BLD: 0.5 K/MCL (ref 0.3–0.9)
MONOCYTES NFR BLD: 7 %
NEUTROPHILS # BLD: 4.3 K/MCL (ref 1.8–7.7)
NEUTROPHILS NFR BLD: 61 %
NRBC BLD MANUAL-RTO: 0 /100 WBC
NT-PROBNP SERPL-MCNC: 24 PG/ML
P AXIS (DEGREES): 86
PLATELET # BLD AUTO: 172 K/MCL (ref 140–450)
POTASSIUM BLD-SCNC: 4 MMOL/L (ref 3.4–5.1)
POTASSIUM SERPL-SCNC: 4 MMOL/L (ref 3.4–5.1)
PR-INTERVAL (MSEC): 222
PROT SERPL-MCNC: 6.5 G/DL (ref 6.4–8.2)
QRS-INTERVAL (MSEC): 98
QT-INTERVAL (MSEC): 432
QTC: 417
R AXIS (DEGREES): 25
RAINBOW EXTRA TUBES HOLD SPECIMEN: NORMAL
RBC # BLD: 4.31 MIL/MCL (ref 4.5–5.9)
REPORT TEXT: NORMAL
SODIUM BLD-SCNC: 137 MMOL/L (ref 135–145)
SODIUM SERPL-SCNC: 137 MMOL/L (ref 135–145)
SYSTOLIC BLOOD PRESSURE: 105
T AXIS (DEGREES): 59
TROPONIN I BLD-MCNC: <0.1 NG/ML
TROPONIN I SERPL DL<=0.01 NG/ML-MCNC: 5 NG/L
VENTRICULAR RATE EKG/MIN (BPM): 56
WBC # BLD: 7.1 K/MCL (ref 4.2–11)

## 2024-07-27 PROCEDURE — 82077 ASSAY SPEC XCP UR&BREATH IA: CPT | Performed by: EMERGENCY MEDICINE

## 2024-07-27 PROCEDURE — 96360 HYDRATION IV INFUSION INIT: CPT

## 2024-07-27 PROCEDURE — 80047 BASIC METABLC PNL IONIZED CA: CPT

## 2024-07-27 PROCEDURE — 80053 COMPREHEN METABOLIC PANEL: CPT | Performed by: EMERGENCY MEDICINE

## 2024-07-27 PROCEDURE — 83735 ASSAY OF MAGNESIUM: CPT | Performed by: EMERGENCY MEDICINE

## 2024-07-27 PROCEDURE — 85025 COMPLETE CBC W/AUTO DIFF WBC: CPT | Performed by: EMERGENCY MEDICINE

## 2024-07-27 PROCEDURE — 93005 ELECTROCARDIOGRAM TRACING: CPT | Performed by: EMERGENCY MEDICINE

## 2024-07-27 PROCEDURE — 70450 CT HEAD/BRAIN W/O DYE: CPT | Performed by: RADIOLOGY

## 2024-07-27 PROCEDURE — 96361 HYDRATE IV INFUSION ADD-ON: CPT

## 2024-07-27 PROCEDURE — 99285 EMERGENCY DEPT VISIT HI MDM: CPT

## 2024-07-27 PROCEDURE — 70450 CT HEAD/BRAIN W/O DYE: CPT

## 2024-07-27 PROCEDURE — 83880 ASSAY OF NATRIURETIC PEPTIDE: CPT | Performed by: EMERGENCY MEDICINE

## 2024-07-27 PROCEDURE — 10002807 HB RX 258: Performed by: EMERGENCY MEDICINE

## 2024-07-27 PROCEDURE — 84484 ASSAY OF TROPONIN QUANT: CPT | Performed by: EMERGENCY MEDICINE

## 2024-07-27 PROCEDURE — 84484 ASSAY OF TROPONIN QUANT: CPT

## 2024-07-27 RX ADMIN — SODIUM CHLORIDE 1000 ML: 9 INJECTION, SOLUTION INTRAVENOUS at 03:38

## 2024-07-27 RX ADMIN — SODIUM CHLORIDE 1000 ML: 9 INJECTION, SOLUTION INTRAVENOUS at 04:40

## 2024-07-27 SDOH — SOCIAL STABILITY: SOCIAL INSECURITY: HOW OFTEN DOES ANYONE, INCLUDING FAMILY AND FRIENDS, PHYSICALLY HURT YOU?: NEVER

## 2024-07-27 SDOH — SOCIAL STABILITY: SOCIAL INSECURITY: HOW OFTEN DOES ANYONE, INCLUDING FAMILY AND FRIENDS, THREATEN YOU WITH HARM?: NEVER

## 2024-07-27 SDOH — SOCIAL STABILITY: SOCIAL INSECURITY: HOW OFTEN DOES ANYONE, INCLUDING FAMILY AND FRIENDS, INSULT OR TALK DOWN TO YOU?: NEVER

## 2024-07-27 SDOH — SOCIAL STABILITY: SOCIAL INSECURITY: HOW OFTEN DOES ANYONE, INCLUDING FAMILY AND FRIENDS, SCREAM OR CURSE AT YOU?: NEVER

## 2024-07-27 ASSESSMENT — ENCOUNTER SYMPTOMS
EYE DISCHARGE: 0
HEADACHES: 0
FEVER: 0
SHORTNESS OF BREATH: 0
FACIAL SWELLING: 0
CONFUSION: 0
NAUSEA: 0

## 2024-07-27 ASSESSMENT — PAIN SCALES - GENERAL: PAINLEVEL_OUTOF10: 2

## 2024-07-27 ASSESSMENT — LIFESTYLE VARIABLES
HOW MANY STANDARD DRINKS CONTAINING ALCOHOL DO YOU HAVE ON A TYPICAL DAY: 0,1 OR 2
HOW OFTEN DO YOU HAVE A DRINK CONTAINING ALCOHOL: 2 TO 3 TIMES PER WEEK
AUDIT-C TOTAL SCORE: 3
HOW OFTEN DO YOU HAVE 6 OR MORE DRINKS ON ONE OCCASION: NEVER